# Patient Record
Sex: MALE | Race: ASIAN | NOT HISPANIC OR LATINO | Employment: FULL TIME | URBAN - METROPOLITAN AREA
[De-identification: names, ages, dates, MRNs, and addresses within clinical notes are randomized per-mention and may not be internally consistent; named-entity substitution may affect disease eponyms.]

---

## 2024-05-20 ENCOUNTER — LAB (OUTPATIENT)
Dept: LAB | Facility: CLINIC | Age: 24
End: 2024-05-20
Payer: COMMERCIAL

## 2024-05-20 ENCOUNTER — OFFICE VISIT (OUTPATIENT)
Dept: INTERNAL MEDICINE | Facility: CLINIC | Age: 24
End: 2024-05-20
Payer: COMMERCIAL

## 2024-05-20 VITALS
DIASTOLIC BLOOD PRESSURE: 92 MMHG | SYSTOLIC BLOOD PRESSURE: 142 MMHG | BODY MASS INDEX: 27.62 KG/M2 | HEART RATE: 94 BPM | OXYGEN SATURATION: 99 % | WEIGHT: 197.3 LBS | HEIGHT: 71 IN

## 2024-05-20 DIAGNOSIS — I10 HYPERTENSION, UNSPECIFIED TYPE: Primary | ICD-10-CM

## 2024-05-20 DIAGNOSIS — I10 HYPERTENSION, UNSPECIFIED TYPE: ICD-10-CM

## 2024-05-20 LAB
ANION GAP SERPL CALCULATED.3IONS-SCNC: 16 MMOL/L (ref 7–15)
BUN SERPL-MCNC: 11.4 MG/DL (ref 6–20)
CALCIUM SERPL-MCNC: 9.7 MG/DL (ref 8.6–10)
CHLORIDE SERPL-SCNC: 100 MMOL/L (ref 98–107)
CREAT SERPL-MCNC: 0.79 MG/DL (ref 0.67–1.17)
DEPRECATED HCO3 PLAS-SCNC: 21 MMOL/L (ref 22–29)
EGFRCR SERPLBLD CKD-EPI 2021: >90 ML/MIN/1.73M2
GLUCOSE SERPL-MCNC: 90 MG/DL (ref 70–99)
POTASSIUM SERPL-SCNC: 3.7 MMOL/L (ref 3.4–5.3)
SODIUM SERPL-SCNC: 137 MMOL/L (ref 135–145)

## 2024-05-20 PROCEDURE — 84244 ASSAY OF RENIN: CPT | Mod: 90 | Performed by: PATHOLOGY

## 2024-05-20 PROCEDURE — 84295 ASSAY OF SERUM SODIUM: CPT | Performed by: INTERNAL MEDICINE

## 2024-05-20 PROCEDURE — 83835 ASSAY OF METANEPHRINES: CPT | Mod: 90 | Performed by: PATHOLOGY

## 2024-05-20 PROCEDURE — 99000 SPECIMEN HANDLING OFFICE-LAB: CPT | Performed by: PATHOLOGY

## 2024-05-20 PROCEDURE — 82088 ASSAY OF ALDOSTERONE: CPT | Performed by: INTERNAL MEDICINE

## 2024-05-20 PROCEDURE — 36415 COLL VENOUS BLD VENIPUNCTURE: CPT | Performed by: PATHOLOGY

## 2024-05-20 PROCEDURE — 99203 OFFICE O/P NEW LOW 30 MIN: CPT | Performed by: INTERNAL MEDICINE

## 2024-05-20 ASSESSMENT — PATIENT HEALTH QUESTIONNAIRE - PHQ9
10. IF YOU CHECKED OFF ANY PROBLEMS, HOW DIFFICULT HAVE THESE PROBLEMS MADE IT FOR YOU TO DO YOUR WORK, TAKE CARE OF THINGS AT HOME, OR GET ALONG WITH OTHER PEOPLE: SOMEWHAT DIFFICULT
SUM OF ALL RESPONSES TO PHQ QUESTIONS 1-9: 10
SUM OF ALL RESPONSES TO PHQ QUESTIONS 1-9: 10

## 2024-05-20 NOTE — PROGRESS NOTES
"HPI  24-year-old seen today to establish primary care concern predominantly about his blood pressure.  He is checking his blood pressure at home it is tends to be running in the 140s over 70s at times intermittently but when he is stressed or anxious it can run up to 150s by his report.  This is aggravated by stress and aggravated by poor sleep.  He has had pulsatile tinnitus which has been evaluated by ENT and with a MR angiogram which showed normal cerebral circulation and no abnormality.  He however plans at this is frequently disruptive of his sleep.  He will occasionally wake up in the morning feeling unrefreshed.  He denies loud snoring, denies sleepiness during the day he has not had any sleep attacks.  His sleep is largely aggravated by his stress.  He in the past has worked with a therapist regarding relaxation techniques and it was working for a while but then he had to abandon the principles.  He can restart we discussed a revisit with a therapist and he will consider this but was not open to it immediately.  He has been exercising more recently and attempt to control his weight he is avoiding added salt and processed foods he is eating 4-5 servings of plants in fruits and vegetables a day.  Otherwise he has had ongoing issues with the tinnitus which he finds distracting him distressing at times.  He does notice that his feet during the day.  He has a strong family history of hypertension in both his parents.  No past medical history on file.  No past surgical history on file.  No family history on file.      Exam:  BP (!) 142/92 (BP Location: Right arm, Patient Position: Sitting, Cuff Size: Adult Large)   Pulse 94   Ht 1.803 m (5' 10.98\")   Wt 89.5 kg (197 lb 4.8 oz)   SpO2 99%   BMI 27.53 kg/m    197 lbs 4.8 oz  Physical Exam   The patient is alert, oriented with a clear sensorium.   Skin shows no lesions or rashes and good turgor.   Head is normocephalic and atraumatic.   Eyes show PERRLA with " benign optic fundi.   Ears show normal TMs bilaterally.   Mouth shows clear oral mucosa.   Neck shows no nodes, thyromegaly or bruits.   Lungs are clear to percussion and auscultation.   Heart shows normal S1 and S2 without murmur or gallop.   Abdomen is soft and nontender without masses, bruits or organomegaly.   Extremities show weak femoral pulses bilaterally no edema and no evidence of active synovitis.   Labs reviewed:  Results for orders placed or performed in visit on 05/20/24   Basic metabolic panel     Status: Abnormal   Result Value Ref Range    Sodium 137 135 - 145 mmol/L    Potassium 3.7 3.4 - 5.3 mmol/L    Chloride 100 98 - 107 mmol/L    Carbon Dioxide (CO2) 21 (L) 22 - 29 mmol/L    Anion Gap 16 (H) 7 - 15 mmol/L    Urea Nitrogen 11.4 6.0 - 20.0 mg/dL    Creatinine 0.79 0.67 - 1.17 mg/dL    GFR Estimate >90 >60 mL/min/1.73m2    Calcium 9.7 8.6 - 10.0 mg/dL    Glucose 90 70 - 99 mg/dL   Metanephrines Plasma Free     Status: None   Result Value Ref Range    Metanephrine 0.16 0.00 - 0.49 nmol/L    Normetanephrine 0.32 0.00 - 0.89 nmol/L    Metanephrines Interpretation See Note    Aldosterone     Status: Normal   Result Value Ref Range    Aldosterone 7.0 0.0 - 31.0 ng/dL   Renin activity     Status: None   Result Value Ref Range    Renin Activity 11.9 ng/mL/hr   Aldosterone Renin Ratio     Status: Normal   Result Value Ref Range    Aldosterone Renin Ratio 0.6 0.0 - 25.0   Aldosterone Renin Ratio     Status: None    Narrative    The following orders were created for panel order Aldosterone Renin Ratio.  Procedure                               Abnormality         Status                     ---------                               -----------         ------                     Aldosterone[[id number]]                  Normal              Final result               Renin activity[[id number]]                                   Final result               Aldosterone Renin Ratio[[id number]]      Normal               Final result                 Please view results for these tests on the individual orders.         ASSESSMENT  1 Hypertension  2 history of pulsatile tinnitus with normal head MRA  3 history of migraine with aura  4 History Anxiety  5 TMJ syndrome right  6 Left maxillary sinus retention cysts on MRI 4/20/23  7 History GERD    Plan  Will investigate for secondary causes of hypertension including coarctation, renal artery stenosis hyperaldosteronism and pheochromocytoma.  We discussed nonpharmacologic blood pressure control measures and home blood pressure monitoring.  He will keep a record of his blood pressure at home and bring this in with his blood pressure monitor in another month.    This note was completed using Dragon voice recognition software.      Luis Enrique Tejeda MD  General Internal Medicine  [facility]  [phone]

## 2024-05-21 LAB — ALDOST SERPL-MCNC: 7 NG/DL (ref 0–31)

## 2024-05-23 LAB
ANNOTATION COMMENT IMP: NORMAL
METANEPHS SERPL-SCNC: 0.16 NMOL/L
NORMETANEPHRINE SERPL-SCNC: 0.32 NMOL/L
RENIN PLAS-CCNC: 11.9 NG/ML/HR

## 2024-05-24 LAB — ALDOST/RENIN PLAS-RTO: 0.6 {RATIO} (ref 0–25)

## 2024-07-28 ENCOUNTER — HEALTH MAINTENANCE LETTER (OUTPATIENT)
Age: 24
End: 2024-07-28

## 2025-08-10 ENCOUNTER — HEALTH MAINTENANCE LETTER (OUTPATIENT)
Age: 25
End: 2025-08-10